# Patient Record
Sex: FEMALE | Race: WHITE | URBAN - METROPOLITAN AREA
[De-identification: names, ages, dates, MRNs, and addresses within clinical notes are randomized per-mention and may not be internally consistent; named-entity substitution may affect disease eponyms.]

---

## 2020-07-27 ENCOUNTER — HOSPITAL ENCOUNTER (OUTPATIENT)
Age: 50
Setting detail: SPECIMEN
Discharge: HOME | End: 2020-07-27
Payer: OTHER GOVERNMENT

## 2020-07-27 ENCOUNTER — OFFICE VISIT (OUTPATIENT)
Dept: PRIMARY CARE CLINIC | Age: 50
End: 2020-07-27
Payer: OTHER GOVERNMENT

## 2020-07-27 VITALS
HEIGHT: 62 IN | TEMPERATURE: 97.5 F | WEIGHT: 165 LBS | HEART RATE: 77 BPM | OXYGEN SATURATION: 97 % | BODY MASS INDEX: 30.36 KG/M2

## 2020-07-27 PROCEDURE — 99203 OFFICE O/P NEW LOW 30 MIN: CPT | Performed by: NURSE PRACTITIONER

## 2020-07-27 RX ORDER — FLUOXETINE HYDROCHLORIDE 40 MG/1
CAPSULE ORAL
COMMUNITY
Start: 2020-05-19

## 2020-07-27 RX ORDER — AMLODIPINE BESYLATE 5 MG/1
TABLET ORAL
COMMUNITY
Start: 2020-06-05

## 2020-07-27 RX ORDER — LOSARTAN POTASSIUM 100 MG/1
100 TABLET ORAL
COMMUNITY
Start: 2020-07-07

## 2020-07-27 ASSESSMENT — PATIENT HEALTH QUESTIONNAIRE - PHQ9
2. FEELING DOWN, DEPRESSED OR HOPELESS: 0
1. LITTLE INTEREST OR PLEASURE IN DOING THINGS: 0
SUM OF ALL RESPONSES TO PHQ9 QUESTIONS 1 & 2: 0
SUM OF ALL RESPONSES TO PHQ QUESTIONS 1-9: 0
SUM OF ALL RESPONSES TO PHQ QUESTIONS 1-9: 0

## 2020-07-27 ASSESSMENT — ENCOUNTER SYMPTOMS
COUGH: 1
DIARRHEA: 0
VOMITING: 0
SORE THROAT: 1

## 2020-07-27 NOTE — PATIENT INSTRUCTIONS
You were tested for COVID today. We will call you with results when they are available. If you have not heard from us in 7 days, please call our office. Patient was evaluated [place] today during this pandemic covid 19 situation. Quarantine as directed  Await results  Increase fluids- stay hydrated  Good handwashing  Supportive care as discussed    If having symptoms- you need to be fever free for 72 hours, other symptoms resolved and at least 10 days passed since first symptom appeared before discontinuing  quarantine- CDC guidelines  tylenol as directed as needed over the counter if able to take  go to the ER for chest pain, short of breath, hard time breathing, persistent vomiting, feeling weaker or dehydrated, any worsening, change or concern  Follow up with primary care for re evaluation  [place] covid 19 call center - [phone]- [phone]  Another good resource for information is coronavirus. [place].gov      The COVID-19 test that was done today can take 1-6 days for results. Until then you should assume you have this disease and adhere to home isolation as described below. When we get the test results back, one of the following readings will be obtained. 1. A positive test means you have the virus. 2.  An inconclusive test means it wasn't sure if you have the virus or not. An inconclusive test result is treated as a positive result and recommendations  are the same as a positive test result. We may ask you to repeat this test in this circumstance. 3.  A negative test means you probably do not have the virus.       Prevention steps for People with positive or inconclusive test results or suspected  COVID-19 (including persons under investigation) who do not need to be hospitalized  and   People with confirmed COVID-19 who were hospitalized and determined to be medically stable to go home    Contacts who are NOT healthcare providers or first responders and are asymptomatic (no fever,  cough, shortness of breath, or difficulty breathing) should self-quarantine for 14 days from the last  date of exposure to confirmed or suspected COVID-19. Your healthcare provider and public health staff will evaluate whether you can be cared for at home. If it is determined that you do not need to be hospitalized and can be isolated at home, you will be monitored by staff from your health department. You should follow the prevention steps below until a healthcare provider or local or state health department says you can return to your normal activities. Stay home except to get medical care  People who are mildly ill with COVID-19 are able to isolate at home during their illness. You should restrict activities outside your home, except for getting medical care. Do not go to work, school, or public areas. Avoid using public transportation, ride-sharing, or taxis. Separate yourself from other people and animals in your home  People: As much as possible, you should stay in a specific room and away from other people in your home. Also, you should use a separate bathroom, if available. Animals: You should restrict contact with pets and other animals while you are sick with COVID-19, just like you would around other people. Although there have not been reports of pets or other animals becoming sick with COVID-19, it is still recommended that people sick with COVID-19 limit contact with animals until more information is known about the virus. When possible, have another member of your household care for your animals while you are sick. If you are sick with COVID-19, avoid contact with your pet, including petting, snuggling, being kissed or licked, and sharing food. If you must care for your pet or be around animals while you are sick, wash your hands before and after you interact with pets and wear a facemask.   Call ahead before visiting your doctor  If you have a medical appointment, call the healthcare provider and tell cleaning spray or wipe, according to the label instructions. Labels contain instructions for safe and effective use of the cleaning product including precautions you should take when applying the product, such as wearing gloves and making sure you have good ventilation during use of the product. Monitor your symptoms  Seek prompt medical attention if your illness is worsening (e.g., difficulty breathing). Before seeking care, call your healthcare provider and tell them that you have, or are being evaluated for, COVID-19. Put on a facemask before you enter the facility. These steps will help the healthcare providers office to keep other people in the office or waiting room from getting infected or exposed. Persons who are placed under active monitoring or facilitated self-monitoring should follow instructions provided by their local health department or occupational health professionals, as appropriate. When working with your local health department check their available hours. If you have a medical emergency and need to call 911, notify the dispatch personnel that you have, or are being evaluated for COVID-19. If possible, put on a facemask before emergency medical services arrive. Discontinuing home isolation  Patients with confirmed COVID-19 should remain under home isolation precautions until the risk of secondary transmission to others is thought to be low. The decision to discontinue home isolation precautions should be made on a case-by-case basis, in consultation with your physician and the health department. Please do NOT make this decision on your own. If your results of the COVID-19 test is NEGATIVE -     The patient may stop isolation, in consultation with your health care provider, typically when: Your healthcare provider has determined that the cause of the illness is NOT COVID-19 and approves your return to work.   OR  Ten (10) days have passed since onset of symptoms AND three days (72 hours) have passed with no fever without taking medication (like Tylenol) to reduce fever,  respiratory symptoms have resolved and you have been evaluated by your health care provider. Please follow up with your physician for evaluation about this. The following websites are the best places for up to date information on this fluid situation. https://coronavirus. [place].gov/wps/portal/gov/covid-19/home/local-health-districts-and-providers/guidance-for-covid-19-exposure-management    Preventing the Spread of Coronavirus Disease 2019 in Homes and Residential Communities     For the most recent information go to [employer]  https://coronavirus. [place].gov/wps/portal/gov/covid-19/home/local-health-districts-and-providers/guidance-for-covid-19-exposure-management

## 2020-07-27 NOTE — PROGRESS NOTES
[address] [facility]  [address] [facility] [address] [address]  Dept: [phone]  Dept Fax: [phone]    Moncho Barnes a 47 y.o. female who presents to the urgent care today for her medical conditions/complaintsas noted below. Lauryn Kilpatrick is c/o of Concern For COVID-19 (headache, sore throat, chest tightness, cough x 3 days)      HPI:     Cc- headache, sore throat, chest tight and cough for 3 days. Body aches  Denies vomiting, diarrhea, loss of taste and smell and rash      Pharyngitis   This is a new problem. Episode onset: 3 days. The problem occurs intermittently. The problem has been waxing and waning. Associated symptoms include coughing, fatigue, myalgias and a sore throat. Pertinent negatives include no chest pain, fever, rash or vomiting. Associated symptoms comments: Chest tightness. Nothing aggravates the symptoms. She has tried rest (fluids) for the symptoms. The treatment provided mild relief. No past medical history on file. Current Outpatient Medications   Medication Sig Dispense Refill    amLODIPine (NORVASC) 5 MG tablet       losartan (COZAAR) 100 MG tablet Take 100 mg by mouth daily (with breakfast)      FLUoxetine (PROZAC) 40 MG capsule TAKE 1 CAPSULE DAILY       No current facility-administered medications for this visit. Allergies   Allergen Reactions    Oxycodone-Acetaminophen Hives       Subjective:     Review of Systems   Constitutional: Positive for fatigue. Negative for fever. HENT: Positive for sore throat. Respiratory: Positive for cough. Cardiovascular: Negative for chest pain. Gastrointestinal: Negative for diarrhea and vomiting. Musculoskeletal: Positive for myalgias. Skin: Negative for rash. All other systems reviewed and are negative. Objective:      Physical Exam  Vitals signs and nursing note reviewed. Constitutional:       General: She is not in acute distress.      Appearance: Normal 10 days passed since first symptom appeared before discontinuing  quarantine- CDC guidelines  tylenol as directed as needed over the counter if able to take  go to the ER for chest pain, short of breath, hard time breathing, persistent vomiting, feeling weaker or dehydrated, any worsening, change or concern  Follow up with primary care for re evaluation  [place] covid 19 call center - [phone]- [phone]  Another good resource for information is coronavirus. [place].gov  Return for follow up with primary care in 7 days, worsening, change or concern. Patient given educational materials - see patientinstructions. Discussed use, benefit, and side effects of prescribed medications. All patient questions answered. Pt voiced understanding.     Electronically signed by MATTHEW Jain 7/27/2020 at 11:04 AM

## 2020-07-30 LAB — SARS-COV-2, NAA: NOT DETECTED
